# Patient Record
Sex: FEMALE | Race: WHITE | NOT HISPANIC OR LATINO | URBAN - METROPOLITAN AREA
[De-identification: names, ages, dates, MRNs, and addresses within clinical notes are randomized per-mention and may not be internally consistent; named-entity substitution may affect disease eponyms.]

---

## 2019-01-24 ENCOUNTER — OFFICE (OUTPATIENT)
Dept: URBAN - METROPOLITAN AREA CLINIC 33 | Facility: CLINIC | Age: 44
End: 2019-01-24
Payer: COMMERCIAL

## 2019-01-24 VITALS
SYSTOLIC BLOOD PRESSURE: 145 MMHG | HEIGHT: 61 IN | DIASTOLIC BLOOD PRESSURE: 91 MMHG | WEIGHT: 247 LBS | HEART RATE: 77 BPM | TEMPERATURE: 97.7 F

## 2019-01-24 DIAGNOSIS — E66.01 MORBID (SEVERE) OBESITY DUE TO EXCESS CALORIES: ICD-10-CM

## 2019-01-24 DIAGNOSIS — Z98.84 BARIATRIC SURGERY STATUS: ICD-10-CM

## 2019-01-24 DIAGNOSIS — R14.0 ABDOMINAL DISTENSION (GASEOUS): ICD-10-CM

## 2019-01-24 DIAGNOSIS — R11.10 VOMITING, UNSPECIFIED: ICD-10-CM

## 2019-01-24 DIAGNOSIS — R10.13 EPIGASTRIC PAIN: ICD-10-CM

## 2019-01-24 DIAGNOSIS — M06.9 RHEUMATOID ARTHRITIS, UNSPECIFIED: ICD-10-CM

## 2019-01-24 DIAGNOSIS — L93.0 DISCOID LUPUS ERYTHEMATOSUS: ICD-10-CM

## 2019-01-24 DIAGNOSIS — Z80.9 FAMILY HISTORY OF MALIGNANT NEOPLASM, UNSPECIFIED: ICD-10-CM

## 2019-01-24 PROCEDURE — 99245 OFF/OP CONSLTJ NEW/EST HI 55: CPT

## 2019-01-24 NOTE — SERVICEHPINOTES
MARIA VICTORIA MIRANDA   is a   43   year old    female h/o gastric bypass in 2002 who is being seen in consultation at the request of   COURTNEY ZABALA   for abdominal pain, bloating, and vomiting that began 1 month ago. She notes "stomach virus" x 3 weeks ago with diarrhea, vomiting, and fevers that lasted 2 days then resolved on its own. She reports epigastric pain radiating across her upper abdomen that occurs several times per week. She mentions asso sx include regurgitation, heartburn, and on occasion vomiting that helps relieve abdominal pain No h/o gastroparesis. She is on OTC Nexium 1 pill used 3x/week taken around 7 30 am then eats by 9 30 am.  She also mentions "bloating" that occurs immediately after eating. She notes beans, cauliflower, green peppers, broccoli will cause gas so tries to avoid these foods. Prior colonoscopy/EGD at [facility] in 2013 (unsure of results requesting records). She has lupus/RA, taking Plaquinel, and followed by rheumatologist Dr Washington who will be seen in 1-2 months. FM h/o liver cirrhosis due to hepatitis in father who passes away for this around age 66. FM h/o stomach cancer in paternal grandmother and maternal uncle. She states no cardiovascular or pulmonary disease. She notes "chest pain" on ROS that she says is mid sternal and associated with regurgitation: No heart palpitation/No dyspnea. Denies nausea, dysphagia, odynophagia, melena,  diarrhea, change in bowel habits, blood in stool, rectal bleeding, weight loss.

## 2019-01-30 ENCOUNTER — ON CAMPUS - OUTPATIENT (OUTPATIENT)
Dept: URBAN - METROPOLITAN AREA HOSPITAL 35 | Facility: HOSPITAL | Age: 44
End: 2019-01-30

## 2019-01-30 DIAGNOSIS — Z98.84 BARIATRIC SURGERY STATUS: ICD-10-CM

## 2019-01-30 DIAGNOSIS — R11.10 VOMITING, UNSPECIFIED: ICD-10-CM

## 2019-01-30 DIAGNOSIS — K29.60 OTHER GASTRITIS WITHOUT BLEEDING: ICD-10-CM

## 2019-01-30 DIAGNOSIS — R10.13 EPIGASTRIC PAIN: ICD-10-CM

## 2019-01-30 PROCEDURE — 43239 EGD BIOPSY SINGLE/MULTIPLE: CPT

## 2019-02-21 ENCOUNTER — OFFICE (OUTPATIENT)
Dept: URBAN - METROPOLITAN AREA CLINIC 33 | Facility: CLINIC | Age: 44
End: 2019-02-21

## 2019-02-21 VITALS
DIASTOLIC BLOOD PRESSURE: 63 MMHG | TEMPERATURE: 97.7 F | SYSTOLIC BLOOD PRESSURE: 112 MMHG | HEIGHT: 61 IN | HEART RATE: 71 BPM | WEIGHT: 252 LBS

## 2019-02-21 DIAGNOSIS — R14.0 ABDOMINAL DISTENSION (GASEOUS): ICD-10-CM

## 2019-02-21 DIAGNOSIS — E66.01 MORBID (SEVERE) OBESITY DUE TO EXCESS CALORIES: ICD-10-CM

## 2019-02-21 DIAGNOSIS — L93.0 DISCOID LUPUS ERYTHEMATOSUS: ICD-10-CM

## 2019-02-21 DIAGNOSIS — Z98.84 BARIATRIC SURGERY STATUS: ICD-10-CM

## 2019-02-21 DIAGNOSIS — M06.9 RHEUMATOID ARTHRITIS, UNSPECIFIED: ICD-10-CM

## 2019-02-21 DIAGNOSIS — K21.9 GASTRO-ESOPHAGEAL REFLUX DISEASE WITHOUT ESOPHAGITIS: ICD-10-CM

## 2019-02-21 DIAGNOSIS — K76.0 FATTY (CHANGE OF) LIVER, NOT ELSEWHERE CLASSIFIED: ICD-10-CM

## 2019-02-21 PROCEDURE — 99214 OFFICE O/P EST MOD 30 MIN: CPT

## 2019-02-21 NOTE — SERVICEHPINOTES
MARIA VICTORIA MIRANDA   is a   43  female h/o gastric bypass (2002), RA/lupus, obesity who is here for f/u to EGD ordered due to upper GI symptoms and fm h/o stomach cancer. Her EGD 01/2019 found mild gastritis and normal duodenum with benign biopsies showing neg h pylori/neg celiac. At prior visit last month she was advised to take PPI Nexium 20 mg qd but adjust the timing to take 30 minutes prior to first meal of the day which she has been doing and reports improvement of GERD symptoms and resolution of vomiting. She has also implemented low FODMAP diet that has reduced bloating. She avoids beans, cauliflower, green peppers, broccoli that will cause gas/bloating. Denies n/v, regurgitation, dysphagia, odynophagia, melena, diarrhea, change in bowel habits, blood in stool, rectal bleeding, weight loss. Concerning her fatty liver seen on recent ultrasound, she is advised to have fibroscan due to fm h/o SHAW in sister and cirrhosis in father. No prior fibroscan. She also had additional labs on 01/30/19 that showed normal LFTs and negative for hep C/hep B (no immunity to hep B so should have booster vaccination).   BR

## 2019-02-28 ENCOUNTER — OFFICE (OUTPATIENT)
Dept: URBAN - METROPOLITAN AREA CLINIC 101 | Facility: CLINIC | Age: 44
End: 2019-02-28

## 2019-02-28 DIAGNOSIS — K76.0 FATTY (CHANGE OF) LIVER, NOT ELSEWHERE CLASSIFIED: ICD-10-CM

## 2019-02-28 PROCEDURE — 91200 LIVER ELASTOGRAPHY: CPT

## 2019-09-11 ENCOUNTER — OFFICE (OUTPATIENT)
Dept: URBAN - METROPOLITAN AREA CLINIC 78 | Facility: CLINIC | Age: 44
End: 2019-09-11

## 2019-09-11 PROCEDURE — 00014: CPT

## 2019-10-17 ENCOUNTER — OFFICE (OUTPATIENT)
Dept: URBAN - METROPOLITAN AREA CLINIC 33 | Facility: CLINIC | Age: 44
End: 2019-10-17

## 2019-10-17 VITALS
DIASTOLIC BLOOD PRESSURE: 86 MMHG | HEART RATE: 69 BPM | SYSTOLIC BLOOD PRESSURE: 134 MMHG | HEIGHT: 61 IN | WEIGHT: 257 LBS | TEMPERATURE: 97.8 F

## 2019-10-17 DIAGNOSIS — R11.0 NAUSEA: ICD-10-CM

## 2019-10-17 DIAGNOSIS — D50.9 IRON DEFICIENCY ANEMIA, UNSPECIFIED: ICD-10-CM

## 2019-10-17 DIAGNOSIS — K21.9 GASTRO-ESOPHAGEAL REFLUX DISEASE WITHOUT ESOPHAGITIS: ICD-10-CM

## 2019-10-17 DIAGNOSIS — L93.0 DISCOID LUPUS ERYTHEMATOSUS: ICD-10-CM

## 2019-10-17 DIAGNOSIS — R14.0 ABDOMINAL DISTENSION (GASEOUS): ICD-10-CM

## 2019-10-17 DIAGNOSIS — R68.81 EARLY SATIETY: ICD-10-CM

## 2019-10-17 DIAGNOSIS — Z98.84 BARIATRIC SURGERY STATUS: ICD-10-CM

## 2019-10-17 DIAGNOSIS — E66.01 MORBID (SEVERE) OBESITY DUE TO EXCESS CALORIES: ICD-10-CM

## 2019-10-17 PROCEDURE — 99214 OFFICE O/P EST MOD 30 MIN: CPT

## 2019-10-17 NOTE — SERVICEHPINOTES
MARIA VICTORIA MIRANDA   is a   44 yo white  female with morbid obesity, gastric bypass (2002), lupus/RA who is here due to abdominal bloating, nausea, vomiting that has been going on for several weeks. She was last seen in office by our group in 02/2019 for f/u due to GERD that improved with Neixum 40 mg qd used 30 min prior to first meal of the day She is still on same PPI dose. She mentions diagnosis of gastroparesis made in 10/2016 at [facility] that was based on clinical symptoms (per patient no prior GI emptying study done) and started on abx Erythromycin 250 mg with each meal and bedtime (total of 1 pill QID) that was used for about 6 months, which provided improvement to her upper GI symptoms. She mentions stopping the abx Erythromycin due to no longer having symptoms and recurrent symptoms about x 1 year since this abx was discontinued. She was most recently seen by PCP in 10/03/2019 who started her on Reglan 1 pill TID that she says has helped relieve her stomach fullness sensation, nausea, and epigastric “pressure" (per patient concerned about ADEs with Reglan). She also has h/o chronic iron deficiency anemia that has been present for years that has been evaluated with EGD/colonoscopy in the past and prior hematologist consult x 3 years ago for anemia that did not find a cause for the anemia at that time. She is here due to her PCP expressing concern about a potential "stomach ulcer" and advised this GI visit. Prior EGD in 01/2019 found mild gastritis and normal duodenum: All benign biopsies showing neg h pylori/neg celiac. Denies vomiting, regurgitation, dysphagia, odynophagia, melena, diarrhea, change in bowel habits, blood in stool, rectal bleeding, weight loss. Reviewed labs from 10/2019 showing anemia given hgb 9.7, hct 32.9, mcv 74, iron 31, iron sat 6, TIBC 517, ferritin 6. BR

## 2019-11-08 ENCOUNTER — OFFICE (OUTPATIENT)
Dept: URBAN - METROPOLITAN AREA CLINIC 33 | Facility: CLINIC | Age: 44
End: 2019-11-08

## 2019-11-08 VITALS
WEIGHT: 257 LBS | HEIGHT: 61 IN | HEART RATE: 86 BPM | SYSTOLIC BLOOD PRESSURE: 136 MMHG | DIASTOLIC BLOOD PRESSURE: 90 MMHG

## 2019-11-08 DIAGNOSIS — K58.9 IRRITABLE BOWEL SYNDROME WITHOUT DIARRHEA: ICD-10-CM

## 2019-11-08 DIAGNOSIS — R14.0 ABDOMINAL DISTENSION (GASEOUS): ICD-10-CM

## 2019-11-08 PROCEDURE — 99213 OFFICE O/P EST LOW 20 MIN: CPT

## 2020-05-18 ENCOUNTER — OFFICE (OUTPATIENT)
Dept: URBAN - METROPOLITAN AREA CLINIC 78 | Facility: CLINIC | Age: 45
End: 2020-05-18

## 2020-05-18 PROCEDURE — 00014: CPT

## 2023-02-19 NOTE — SERVICENOTES
I have reviewed the history, physical exam, assessment and management plans.  I concur with or have edited all elements of her note.
Wheelchair

## 2024-02-02 ENCOUNTER — NEW PATIENT (OUTPATIENT)
Dept: URBAN - METROPOLITAN AREA CLINIC 66 | Facility: CLINIC | Age: 49
End: 2024-02-02

## 2024-02-02 DIAGNOSIS — H35.463: ICD-10-CM

## 2024-02-02 DIAGNOSIS — H43.823: ICD-10-CM

## 2024-02-02 DIAGNOSIS — M32.10: ICD-10-CM

## 2024-02-02 DIAGNOSIS — Z79.899: ICD-10-CM

## 2024-02-02 PROCEDURE — 92134 CPTRZ OPH DX IMG PST SGM RTA: CPT

## 2024-02-02 PROCEDURE — 99204 OFFICE O/P NEW MOD 45 MIN: CPT

## 2024-02-02 PROCEDURE — 92250 FUNDUS PHOTOGRAPHY W/I&R: CPT | Mod: NC

## 2024-02-02 PROCEDURE — 92201 OPSCPY EXTND RTA DRAW UNI/BI: CPT

## 2024-02-02 ASSESSMENT — TONOMETRY
OD_IOP_MMHG: 18
OS_IOP_MMHG: 19

## 2024-02-02 ASSESSMENT — VISUAL ACUITY
OS_SC: 20/20
OD_SC: 20/20